# Patient Record
Sex: MALE | Race: WHITE
[De-identification: names, ages, dates, MRNs, and addresses within clinical notes are randomized per-mention and may not be internally consistent; named-entity substitution may affect disease eponyms.]

---

## 2017-09-16 ENCOUNTER — HOSPITAL ENCOUNTER (EMERGENCY)
Dept: HOSPITAL 75 - ER | Age: 72
Discharge: HOME | End: 2017-09-16
Payer: MEDICARE

## 2017-09-16 VITALS — BODY MASS INDEX: 25.84 KG/M2 | HEIGHT: 73 IN | WEIGHT: 195 LBS

## 2017-09-16 VITALS — DIASTOLIC BLOOD PRESSURE: 88 MMHG | SYSTOLIC BLOOD PRESSURE: 145 MMHG

## 2017-09-16 DIAGNOSIS — I10: ICD-10-CM

## 2017-09-16 DIAGNOSIS — S62.111A: Primary | ICD-10-CM
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
## 2017-09-16 DIAGNOSIS — Z87.891: ICD-10-CM
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
## 2017-09-16 PROCEDURE — 73110 X-RAY EXAM OF WRIST: CPT

## 2017-09-16 PROCEDURE — 29125 APPL SHORT ARM SPLINT STATIC: CPT

## 2017-09-16 PROCEDURE — 96372 THER/PROPH/DIAG INJ SC/IM: CPT

## 2017-09-16 NOTE — DIAGNOSTIC IMAGING REPORT
Right wrist series.



INDICATION: Wrist pain and wrist injury.



FINDINGS: On the lateral view, there is abnormal ossification

demonstrated on the dorsal aspect of the wrist at a typical

location for a triquetral fracture. No other fractures are

evident. Wrist alignment is unremarkable. There are

osteoarthritic changes of the radiocarpal joint and also at the

first carpometacarpal joint.



IMPRESSION:

1. Findings compatible with a triquetral fracture. No other

fractures are evident. Alignment is normal. Background features

of radiocarpal and first carpometacarpal osteoarthritic changes

are present.



Dictated by: 



  Dictated on workstation # [id number]

## 2017-09-16 NOTE — ED UPPER EXTREMITY
General


Chief Complaint:  Upper Extremity


Stated Complaint:  R WRIST SWELLING/PAIN


Source:  patient


Exam Limitations:  no limitations





History of Present Illness


Time seen by provider:  09:55


Initial Comments


Here with complaint of right wrist pain.  States that he was working on fence 

on Wednesday or Thursday and Thursday night went bowling.  However little bit 

of pain but woke up with more pain Friday to the wrist area.  Woke up this 

morning with swelling and more significant pain.  Unsure of what caused the 

injury.  States that he did have an injury similar many years ago but has not 

had problems since like this.  Denies numbness of the fingers.  Retains 

movement but painful.  Does feel like his fingers are drawing up with the pain 

and swelling.  Right-hand-dominant.  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 

his adult life.


Onset:  other (a few days ago)


Severity:  moderate


Pain/Injury Location:  left wrist


Method of Injury:  unknown


Modifying Factors:  Improves With Immobilization, Worse With Movement





Allergies and Home Medications


Allergies


Coded Allergies:  


     No Known Drug Allergies (Unverified , 12/18/10)





Home Medications


Isosorbide Mononitrate 30 Mg Tab.er.24h, (Reported)


Lisinopril/Hydrochlorothiazide 1 Each Tablet, (Reported)


Nebivolol HCl 5 Mg Tablet, (Reported)


Omeprazole 20 Mg Capsule., (Reported)





Constitutional:  see HPI, No chills, No fever


Respiratory:  no symptoms reported


Cardiovascular:  no symptoms reported


Musculoskeletal:  see HPI, joint pain, joint swelling, muscle pain


Skin:  change in color, No lesions, No rash


Psychiatric/Neurological:  No Symptoms Reported





Past Medical-Social-Family Hx


Patient Social History


Alcohol Use:  Denies Use


Recreational Drug Use:  No


Smoking Status:  Former Smoker


Recent Foreign Travel:  No


Contact w/Someone Who Travel:  No





Surgeries


History of Surgeries:  No





Cardiovascular


History of Cardiac Disorders:  Yes


Cardiac Disorders:  Hypertension





Integumentary


History of Skin or Integumenta:  No





Reviewed Nursing Assessment


Reviewed/Agree w Nursing PMH:  Yes





Family Medical History


Significant Family History:  No Pertinent Family Hx





Physical Exam


Vital Signs





Vital Sign - Last 12Hours








 17





 09:49


 


Temp 96.3


 


Pulse 65


 


Resp 18


 


B/P (MAP) 145/88


 


Pulse Ox 95


 


O2 Delivery Room Air





Capillary Refill :


General Appearance:  WD/WN, no apparent distress


Cardiovascular:  regular rate, rhythm, no murmur


Respiratory:  lungs clear, normal breath sounds


Wrist:  Yes bone tenderness, Yes limited ROM, Yes pain, Yes soft tissue 

tenderness, Yes swelling (swollen to the right wrist especially on the dorsum.  

Mild erythema noted to the area of swelling.)


Neurologic/Psychiatric:  alert, oriented x 3


Skin:  normal color, warm/dry





Progress/Results/Core Measures


Results/Orders


My Orders





Orders - SEVEN HOBBS MD


Ketorolac Injection (Toradol Injection) (17 09:57)


Wrist, Right, 3 Views Or More (17 09:57)





Vital Signs/I&O





Vital Sign - Last 12Hours








 17





 09:49


 


Temp 96.3


 


Pulse 65


 


Resp 18


 


B/P (MAP) 145/88


 


Pulse Ox 95


 


O2 Delivery Room Air








Progress Note :  


Progress Note


Seen and evaluated.  Toradol 60 mg IM.  X-ray right wrist ordered.  Monitor 

patient.  1050: X-ray noted.  Right wrist splint placed.  Dr. Madrigal, Orthopedist 

paged for discussion.





Diagnostic Imaging





   Diagonstic Imaging:  Xray


   Plain Films/CT/US/NM/MRI:  other (wrist)


Comments


 VIA [facility].


 [place], [place]





NAME:   SHUN BARRON


[facility] REC#:   [id number]


ACCOUNT#:   [id number]


PT STATUS:   REG ER


:   1945


PHYSICIAN:   SEVEN HOBBS MD


ADMIT DATE:   17/ER


 ***Draft***


Date of Exam:17





WRIST, RIGHT, 3 VIEWS OR MORE








Right wrist series.





INDICATION: Wrist pain and wrist injury.





FINDINGS: On the lateral view, there is abnormal ossification


demonstrated on the dorsal aspect of the wrist at a typical


location for a triquetral fracture. No other fractures are


evident. Wrist alignment is unremarkable. There are


osteoarthritic changes of the radiocarpal joint and also at the


first carpometacarpal joint.





IMPRESSION:


1. Findings compatible with a triquetral fracture. No other


fractures are evident. Alignment is normal. Background features


of radiocarpal and first carpometacarpal osteoarthritic changes


are present.





  Dictated on workstation # [id number]








Dict:   17 1017


Trans:   17 1028


[facility] [id number]





Interpreted by:     SAE BOSWELL MD


Electronically signed by:





Departure


Impression


Impression:  


 Primary Impression:  


 Fracture of triquetrum of right wrist


 Qualified Codes:  S62.111A - Displaced fracture of triquetrum [cuneiform] bone

, right wrist, initial encounter for closed fracture


Disposition:   HOME, SELF-CARE


Condition:  Stable





Departure-Patient Inst.


Decision time for Depature:  11:00


Referrals:  


GERMAN STEVENSON MD (PCP/Family)


Primary Care Physician








OSEAS BAUTISTA PAUL W DO


Patient Instructions:  Wrist Fracture (DC)





Add. Discharge Instructions:  


All discharge instructions reviewed with patient and/or family. Voiced 

understanding.





Take medications as directed.  Keep splint on at all times except when 

showering.  You may use ice packs over area of concern 20 minutes per hour as 

needed for swelling and pain.  Elevate the wrist to decrease swelling as 

needed.  Call the orthopedic physician listed on Monday for recheck and for 

further evaluation.  Return for worse pain, swelling, weakness or other 

concerns as needed.


Scripts


Hydrocodone/Acetaminophen (Hydrocodon -Acetaminophen 5-325) 1 Each Tablet


1-2 EACH PO Q6H Y for PAIN-MODERATE, #15 TAB 0 Refills


   Prov: SEVEN HOBBS MD         17











SEVEN HOBBS MD Sep 16, 2017 10:10

## 2017-11-13 ENCOUNTER — HOSPITAL ENCOUNTER (OUTPATIENT)
Dept: HOSPITAL 75 - RAD | Age: 72
End: 2017-11-13
Attending: NURSE PRACTITIONER
Payer: MEDICARE

## 2017-11-13 DIAGNOSIS — M89.8X0: ICD-10-CM

## 2017-11-13 DIAGNOSIS — S63.591A: Primary | ICD-10-CM

## 2017-11-13 DIAGNOSIS — M94.231: ICD-10-CM

## 2017-11-13 PROCEDURE — 73221 MRI JOINT UPR EXTREM W/O DYE: CPT

## 2017-11-13 NOTE — DIAGNOSTIC IMAGING REPORT
PROCEDURE: MRI right joint upper extremity without contrast.



TECHNIQUE: Multiplanar, multisequence non contrast-enhanced MRI

of the right upper extremity was accomplished.



INDICATION: Wrist pain.



COMPARISON: Right wrist radiographs of 9/16/17.



FINDINGS:



Intrinsic ligaments: Evaluation of the intrinsic ligaments of the

wrist is limited without intra-articular contrast. Allowing for

this, the TFC articular disc has heterogeneous intrinsic signal

indicative of degenerative intrasubstance tearing. There is

likely a central perforating tear which is degenerative in

nature. The scapholunate ligament is likely disrupted resulting

in dorsal intercalated segmental instability of the lunate).

Assessment of the lunotriquetral ligament is very limited without

contrast.



Bones and cartilage: Anterior subluxation of the lunate with

dorsal tilt. There is abnormal edema throughout the lunate and

capitate which is likely degenerative in nature due to altered

biomechanics from the dizzy deformity. There is also subchondral

bone marrow edema in proximal pole of the scaphoid with

associated full-thickness chondromalacia of the distal radial

articular surface. No fracture.



Tendons: Minimal tenosynovitis of the fourth extensor

compartment. No tendon tear. The flexor tendons are normal.



Soft tissues: No ganglion about the wrist. Mild synovitis

throughout the carpus is likely degenerative in nature.



IMPRESSION:

1. Dorsal intercalated segmental instability (DISI) secondary to

chronic scapholunate ligament tear. No scapholunate advanced

collapse (SLAC wrist).

2. Degenerative bone marrow edema throughout the lunate and

capitate, as well as less advanced scattered foci of degenerative

bone marrow edema in the proximal carpal row. Diffuse

full-thickness chondromalacia throughout the radiocarpal joint.

3. Mild tenosynovitis of the fourth extensor compartment

(extensor digiti) may be degenerative or reactive in nature.



Dictated by: 



  Dictated on workstation # [id number]

## 2019-11-06 ENCOUNTER — HOSPITAL ENCOUNTER (EMERGENCY)
Dept: HOSPITAL 75 - ER | Age: 74
Discharge: HOME | End: 2019-11-06
Payer: MEDICARE

## 2019-11-06 VITALS — DIASTOLIC BLOOD PRESSURE: 97 MMHG | SYSTOLIC BLOOD PRESSURE: 160 MMHG

## 2019-11-06 VITALS — WEIGHT: 190.26 LBS | HEIGHT: 71.97 IN | BODY MASS INDEX: 25.77 KG/M2
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
## 2019-11-06 DIAGNOSIS — I10: ICD-10-CM

## 2019-11-06 DIAGNOSIS — Z23: ICD-10-CM

## 2019-11-06 DIAGNOSIS — S68.011A: Primary | ICD-10-CM

## 2019-11-06 PROCEDURE — 90715 TDAP VACCINE 7 YRS/> IM: CPT

## 2019-11-06 PROCEDURE — 73130 X-RAY EXAM OF HAND: CPT

## 2019-11-06 NOTE — DIAGNOSTIC IMAGING REPORT
EXAMINATION: Left hand 3 views.



HISTORY: Laceration.



FINDINGS: No comparison available.



There is a large soft tissue laceration of the left thumb

distally. The tuft of the distal phalanx has been fractured and

is within the nearly amputated segment. Small fragments within

the wound may represent displaced tiny bone fragments versus

foreign debris.



IMPRESSION:



1. Near amputation of the left thumb tip with portions of the

tuft of the distal phalanx fractured and the present in the

nearly amputated soft tissue.



2. High attenuation foci within the lumen may represent foreign

debris versus ossific fragments.



Dictated by: 



  Dictated on workstation # [id number]

## 2019-11-06 NOTE — ED UPPER EXTREMITY
General


Chief Complaint:  Upper Extremity


Stated Complaint:  CUT PART OF FINGER OFF


Nursing Triage Note:  


Pt cut off portion of L thumb on table saw.  Bleeding controlled with pressure. 




Pt does report taking blood thinners.


Nursing Sepsis Screen:  No Definite Risk


Source:  patient


Exam Limitations:  no limitations





History of Present Illness


Date Seen by Provider:  Nov 6, 2019


Time Seen by Provider:  17:19


Initial Comments


To ER with reports that he cut off the lateral tip of his left thumb on a table 

saw just prior to arrival. Tetanus is not up-to-date.


Onset:  just prior to arrival


Severity:  moderate


Pain/Injury Location:  left thumb


Modifying Factors:  Worse With Movement





Allergies and Home Medications


Allergies


Coded Allergies:  


     No Known Drug Allergies (Unverified , 12/18/10)





Home Medications


Hydrocodone Bit/Acetaminophen 1 Each Tablet, 1-2 EACH PO Q6H PRN for PAIN-

MODERATE


   Prescribed by: SEVEN HOBBS on 9/16/17 1105





Patient Home Medication List


Home Medication List Reviewed:  Yes





Review of Systems


Constitutional:  see HPI


EENTM:  see HPI


Respiratory:  no symptoms reported


Cardiovascular:  no symptoms reported


Genitourinary:  no symptoms reported


Musculoskeletal:  see HPI


Skin:  no symptoms reported


Psychiatric/Neurological:  No Symptoms Reported





Past Medical-Social-Family Hx


Patient Social History


Alcohol Use:  Denies Use


Recreational Drug Use:  No


2nd Hand Smoke Exposure:  No


Recent Foreign Travel:  No


Contact w/Someone Who Travel:  No


Recent Infectious Disease Expo:  No





Past Medical History


Surgeries:  No


Cardiac:  Yes


Hypertension


Integumentary:  No





Family Medical History


No Pertinent Family Hx





Physical Exam


Vital Signs





Vital Signs - First Documented








 11/6/19





 16:53


 


Temp 36.9


 


Pulse 91


 


Resp 15


 


B/P (MAP) 160/97 (118)


 


Pulse Ox 96


 


O2 Delivery Room Air





Capillary Refill : Less Than 3 Seconds


Height, Weight, BMI


Height: 6'1.00"


Weight: 195lbs. oz. 88.430027rd; 25.00 BMI


Method:Stated


General Appearance:  WD/WN, no apparent distress


HEENT:  PERRL/EOMI, normal ENT inspection


Respiratory:  no respiratory distress, no accessory muscle use


Shoulder:  normal inspection, non-tender


Elbow/Forearm:  normal inspection, non-tender, Left


Wrist:  Yes normal inspection, Yes non-tender


Hand:  Left, laceration, nail injury


Neurologic/Tendon:  normal sensation, normal motor functions, normal tendon 

functions


Neurologic/Psychiatric:  alert, normal mood/affect, oriented x 3


Skin:  normal color, warm/dry





Procedures/Interventions





   Wound Location:  Upper Extremities


   Wound Length (cm):  4


   Wound's Depth, Shape:  linear, bone


   Wound Explored:  clean


   Irrigated w/ Saline (ccs):  60


   Anesthesia:  1% Lidocaine


   Suture:  Prolene


   Suture Size:  4-0


   Number of Sutures:  7


   Layer Closure?:  1


   Number Deep Layer Sutures:  0


Progress


Digital block was done using a 50-50 mixture of 2.5 mL of 1% lidocaine without 

epinephrine, 2.5% bupivacaine without epinephrine. The bone that was sticking 

out beyond the tissues was Dameon down. About three fourths of the distal nail is

gone. The contused devitalized tissues were debrided. 4-0 Prolene sutures simple

F and style was then used to close the wound loosely. This was covered with oil 

emulsion and tube gauze.





Progress/Results/Core Measures


Results/Orders


My Orders





Orders - HANNA MCWILLIAMS


Bupivacaine 0.5% Injection (Sensorcaine (11/6/19 17:15)


Dipht,Pertuss(Acell),Tet Adult (Boostrix (11/6/19 17:15)


Hydrocodone/Apap 5/325 Tablet (Lortab 5 (11/6/19 17:15)


Cefazolin  Injection (Ancef  Injection) (11/6/19 17:15)


Hand, Left, 3 Views (11/6/19 17:15)


Water (Sterile) For Injection (Sterile W (11/6/19 17:18)


Water (Sterile) For Injection (Sterile W (11/6/19 17:29)


Rx-Hydrocodone/Apap 5-325 Mg (Rx-Vicodin (11/6/19 19:15)


Rx-Cephalexin Capsule (Rx-Keflex Capsule (11/6/19 19:02)





Medications Given in ED





Current Medications








 Medications  Dose


 Ordered  Sig/Shantel


 Route  Start Time


 Stop Time Status Last Admin


Dose Admin


 


 Acetaminophen/


 Hydrocodone Bitart  1 tab  ONCE  ONCE


 PO  11/6/19 17:15


 11/6/19 17:16 DC 11/6/19 17:26


1 TAB








Vital Signs/I&O











 11/6/19





 16:53


 


Temp 36.9


 


Pulse 91


 


Resp 15


 


B/P (MAP) 160/97 (118)


 


Pulse Ox 96


 


O2 Delivery Room Air














Blood Pressure Mean:                    118


POS








Departure


Impression





   Primary Impression:  


   Traumatic amputation of fingertip


   Qualified Codes:  S68.119A - Complete traumatic metacarpophalangeal 

   amputation of unspecified finger, initial encounter


Disposition:  01 HOME, SELF-CARE


Condition:  Stable





Departure-Patient Inst.


Decision time for Depature:  19:05


Referrals:  


GERMAN STEVENSON MD (PCP/Family)


Primary Care Physician


Patient Instructions:  Amputation of the Finger or Fingertip





Add. Discharge Instructions:  


1. Return to ER on Saturday to have the stitches reevaluated and the dressing 

changed. Antibiotic and pain medication in the meantime. Return to ER for any 

fevers intolerable pain or other concerns.


Scripts


Hydrocodone/Acetaminophen (Norco 5-325 Tablet) 1 Each Tablet


1 TAB PO Q4-6HR for Pain MDD 10 TABS for 7 Days, #14 TAB


   Prov: HANNA MCWILLIAMS         11/6/19 


Doxycycline Hyclate (Doxycycline Hyclate) 100 Mg Tablet


100 MG PO BID, #20 TAB 0 Refills


   Prov: HANNA MCWILLIAMS         11/6/19











HANNA MCWILLIAMS              Nov 6, 2019 17:20


POS

## 2022-01-27 ENCOUNTER — HOSPITAL ENCOUNTER (OUTPATIENT)
Dept: HOSPITAL 75 - RAD | Age: 77
End: 2022-01-27
Attending: NURSE PRACTITIONER
Payer: MEDICARE

## 2022-01-27 DIAGNOSIS — R91.1: ICD-10-CM

## 2022-01-27 DIAGNOSIS — J43.2: Primary | ICD-10-CM

## 2022-01-27 DIAGNOSIS — Z87.891: ICD-10-CM

## 2022-01-27 PROCEDURE — 71250 CT THORAX DX C-: CPT

## 2022-01-27 NOTE — DIAGNOSTIC IMAGING REPORT
EXAMINATION: CT chest without contrast.



TECHNIQUE: Multiple contiguous axial images were obtained through

the chest without the use of intravenous contrast. All CT scans

use one or more of the following dose optimizing techniques:

automated exposure control, MA and/or KvP adjustment based on

patient size and exam type or iterative reconstruction. 



HISTORY: Cough. History of smoking.



COMPARISON: 10/27/2014.



FINDINGS: The heart size is within normal limits. No pericardial

effusion is present. There is calcified aortic and coronary

atherosclerotic plaque.



There is no mediastinal, hilar, or axillary lymphadenopathy. 



Centrilobular emphysema is seen throughout the lungs, greatest in

the apices. Nodule is seen in the left upper lobe measuring 0.4

cm. Bronchial wall thickening is seen throughout the lungs. There

are no focal areas of consolidation. No central endobronchial

obstructing lesions are identified. 



There is no pleural effusion or pneumothorax. 



The osseous structures demonstrate no acute abnormalities. 



Limited views of the upper abdominal structures demonstrate no

acute abnormalities. Both adrenal glands are unremarkable.



IMPRESSION: 

1. Bronchial wall thickening throughout the lungs, suggestive of

bronchitis/bronchiolitis. No focal consolidations.

2. Nodule in the left upper lobe measuring 0.4 cm. Given the

patient's history of smoking follow-up should be considered in 12

months to ensure stability.

3. Centrilobular emphysema.



Dictated by: 



  Dictated on workstation # [id number]